# Patient Record
Sex: FEMALE | Race: OTHER | NOT HISPANIC OR LATINO
[De-identification: names, ages, dates, MRNs, and addresses within clinical notes are randomized per-mention and may not be internally consistent; named-entity substitution may affect disease eponyms.]

---

## 2021-01-04 PROBLEM — Z00.00 ENCOUNTER FOR PREVENTIVE HEALTH EXAMINATION: Status: ACTIVE | Noted: 2021-01-04

## 2021-01-08 ENCOUNTER — APPOINTMENT (OUTPATIENT)
Dept: OBGYN | Facility: CLINIC | Age: 26
End: 2021-01-08
Payer: COMMERCIAL

## 2021-01-08 VITALS
BODY MASS INDEX: 28.44 KG/M2 | TEMPERATURE: 96.8 F | WEIGHT: 210 LBS | DIASTOLIC BLOOD PRESSURE: 70 MMHG | SYSTOLIC BLOOD PRESSURE: 110 MMHG | HEIGHT: 72 IN

## 2021-01-08 DIAGNOSIS — Z78.9 OTHER SPECIFIED HEALTH STATUS: ICD-10-CM

## 2021-01-08 DIAGNOSIS — Z80.3 FAMILY HISTORY OF MALIGNANT NEOPLASM OF BREAST: ICD-10-CM

## 2021-01-08 DIAGNOSIS — Z80.49 FAMILY HISTORY OF MALIGNANT NEOPLASM OF OTHER GENITAL ORGANS: ICD-10-CM

## 2021-01-08 PROCEDURE — 81003 URINALYSIS AUTO W/O SCOPE: CPT | Mod: QW

## 2021-01-08 PROCEDURE — 99202 OFFICE O/P NEW SF 15 MIN: CPT

## 2021-01-08 PROCEDURE — 99072 ADDL SUPL MATRL&STAF TM PHE: CPT

## 2021-01-08 PROCEDURE — 36415 COLL VENOUS BLD VENIPUNCTURE: CPT

## 2021-01-08 NOTE — REASON FOR VISIT
[Initial] : an initial consultation for [Dysmenorrhea] : dysmenorrhea [Menorrhagia] : menorrhagia [Pelvic Pain] : pelvic pain [Dyspareunia] : dyspareunia

## 2021-01-15 ENCOUNTER — APPOINTMENT (OUTPATIENT)
Dept: OBGYN | Facility: CLINIC | Age: 26
End: 2021-01-15
Payer: COMMERCIAL

## 2021-01-15 ENCOUNTER — ASOB RESULT (OUTPATIENT)
Age: 26
End: 2021-01-15

## 2021-01-15 VITALS
DIASTOLIC BLOOD PRESSURE: 76 MMHG | TEMPERATURE: 96.8 F | HEIGHT: 72 IN | SYSTOLIC BLOOD PRESSURE: 112 MMHG | WEIGHT: 210 LBS | BODY MASS INDEX: 28.44 KG/M2

## 2021-01-15 DIAGNOSIS — Z01.419 ENCOUNTER FOR GYNECOLOGICAL EXAMINATION (GENERAL) (ROUTINE) W/OUT ABNORMAL FINDINGS: ICD-10-CM

## 2021-01-15 LAB
APPEARANCE: CLEAR
BACTERIA UR CULT: NORMAL
BACTERIA: NEGATIVE
BILIRUB UR QL STRIP: ABNORMAL
BILIRUBIN URINE: NEGATIVE
BLOOD URINE: NEGATIVE
C TRACH RRNA SPEC QL NAA+PROBE: NOT DETECTED
CANDIDA VAG CYTO: NOT DETECTED
COLOR: COLORLESS
ESTRADIOL SERPL-MCNC: 43 PG/ML
FSH SERPL-MCNC: 8 IU/L
G VAGINALIS+PREV SP MTYP VAG QL MICRO: NOT DETECTED
GLUCOSE QUALITATIVE U: NEGATIVE
GLUCOSE UR-MCNC: NORMAL
HAV IGM SER QL: NONREACTIVE
HBV CORE IGM SER QL: NONREACTIVE
HBV SURFACE AG SER QL: NONREACTIVE
HCG SERPL-MCNC: <1 MIU/ML
HCG UR QL: 0.2 EU/DL
HCG UR QL: NEGATIVE
HCV AB SER QL: NONREACTIVE
HCV S/CO RATIO: 0.1 S/CO
HGB UR QL STRIP.AUTO: NORMAL
HIV1+2 AB SPEC QL IA.RAPID: NONREACTIVE
HSV 1+2 IGG SER IA-IMP: NEGATIVE
HSV 1+2 IGG SER IA-IMP: NEGATIVE
HSV1 IGG SER QL: <0.01 INDEX
HSV2 IGG SER QL: 0.08 INDEX
HYALINE CASTS: 0 /LPF
KETONES UR-MCNC: ABNORMAL
KETONES URINE: NEGATIVE
LEUKOCYTE ESTERASE UR QL STRIP: NORMAL
LEUKOCYTE ESTERASE URINE: NEGATIVE
MICROSCOPIC-UA: NORMAL
N GONORRHOEA RRNA SPEC QL NAA+PROBE: NOT DETECTED
NITRITE UR QL STRIP: NORMAL
NITRITE URINE: NEGATIVE
PH UR STRIP: 7
PH URINE: 7
PROLACTIN SERPL-MCNC: 17 NG/ML
PROT UR STRIP-MCNC: ABNORMAL
PROTEIN URINE: NEGATIVE
QUALITY CONTROL: YES
RED BLOOD CELLS URINE: 1 /HPF
SOURCE AMPLIFICATION: NORMAL
SP GR UR STRIP: 1.02
SPECIFIC GRAVITY URINE: 1.01
SQUAMOUS EPITHELIAL CELLS: 0 /HPF
T PALLIDUM AB SER QL IA: NEGATIVE
T VAGINALIS VAG QL WET PREP: NOT DETECTED
TSH SERPL-ACNC: 1.22 UIU/ML
UROBILINOGEN URINE: NORMAL
WHITE BLOOD CELLS URINE: 0 /HPF

## 2021-01-15 PROCEDURE — 99395 PREV VISIT EST AGE 18-39: CPT

## 2021-01-15 PROCEDURE — 99072 ADDL SUPL MATRL&STAF TM PHE: CPT

## 2021-01-15 PROCEDURE — 81025 URINE PREGNANCY TEST: CPT

## 2021-01-15 PROCEDURE — 81003 URINALYSIS AUTO W/O SCOPE: CPT | Mod: QW

## 2021-01-15 PROCEDURE — 76830 TRANSVAGINAL US NON-OB: CPT

## 2021-01-15 PROCEDURE — 99213 OFFICE O/P EST LOW 20 MIN: CPT | Mod: 25

## 2021-01-28 LAB — CYTOLOGY CVX/VAG DOC THIN PREP: NORMAL

## 2021-01-29 ENCOUNTER — TRANSCRIPTION ENCOUNTER (OUTPATIENT)
Age: 26
End: 2021-01-29

## 2021-02-26 ENCOUNTER — OUTPATIENT (OUTPATIENT)
Dept: OUTPATIENT SERVICES | Facility: HOSPITAL | Age: 26
LOS: 1 days | End: 2021-02-26
Payer: COMMERCIAL

## 2021-02-26 DIAGNOSIS — Z01.818 ENCOUNTER FOR OTHER PREPROCEDURAL EXAMINATION: ICD-10-CM

## 2021-02-26 LAB
APPEARANCE UR: CLEAR — SIGNIFICANT CHANGE UP
APTT BLD: 29.6 SEC — SIGNIFICANT CHANGE UP (ref 27.5–35.5)
BASOPHILS # BLD AUTO: 0.02 K/UL — SIGNIFICANT CHANGE UP (ref 0–0.2)
BASOPHILS # BLD AUTO: 0.03 K/UL
BASOPHILS NFR BLD AUTO: 0.3 % — SIGNIFICANT CHANGE UP (ref 0–2)
BASOPHILS NFR BLD AUTO: 0.6 %
BILIRUB UR-MCNC: NEGATIVE — SIGNIFICANT CHANGE UP
COLOR SPEC: YELLOW — SIGNIFICANT CHANGE UP
DIFF PNL FLD: NEGATIVE — SIGNIFICANT CHANGE UP
EOSINOPHIL # BLD AUTO: 0.03 K/UL — SIGNIFICANT CHANGE UP (ref 0–0.5)
EOSINOPHIL # BLD AUTO: 0.05 K/UL
EOSINOPHIL NFR BLD AUTO: 0.4 % — SIGNIFICANT CHANGE UP (ref 0–6)
EOSINOPHIL NFR BLD AUTO: 1 %
GLUCOSE UR QL: NEGATIVE MG/DL — SIGNIFICANT CHANGE UP
HCG UR QL: NEGATIVE — SIGNIFICANT CHANGE UP
HCT VFR BLD CALC: 39.5 % — SIGNIFICANT CHANGE UP (ref 34.5–45)
HCT VFR BLD CALC: 40.7 %
HGB BLD-MCNC: 13 G/DL — SIGNIFICANT CHANGE UP (ref 11.5–15.5)
HGB BLD-MCNC: 13.3 G/DL
IMM GRANULOCYTES NFR BLD AUTO: 0 %
IMM GRANULOCYTES NFR BLD AUTO: 0.1 % — SIGNIFICANT CHANGE UP (ref 0–1.5)
INR BLD: 1.03 RATIO — SIGNIFICANT CHANGE UP (ref 0.88–1.16)
KETONES UR-MCNC: NEGATIVE — SIGNIFICANT CHANGE UP
LEUKOCYTE ESTERASE UR-ACNC: NEGATIVE — SIGNIFICANT CHANGE UP
LYMPHOCYTES # BLD AUTO: 2.66 K/UL
LYMPHOCYTES # BLD AUTO: 3.21 K/UL — SIGNIFICANT CHANGE UP (ref 1–3.3)
LYMPHOCYTES # BLD AUTO: 42.7 % — SIGNIFICANT CHANGE UP (ref 13–44)
LYMPHOCYTES NFR BLD AUTO: 52.4 %
MAN DIFF?: NORMAL
MCHC RBC-ENTMCNC: 30.7 PG — SIGNIFICANT CHANGE UP (ref 27–34)
MCHC RBC-ENTMCNC: 31.1 PG
MCHC RBC-ENTMCNC: 32.7 GM/DL
MCHC RBC-ENTMCNC: 32.9 GM/DL — SIGNIFICANT CHANGE UP (ref 32–36)
MCV RBC AUTO: 93.4 FL — SIGNIFICANT CHANGE UP (ref 80–100)
MCV RBC AUTO: 95.1 FL
MONOCYTES # BLD AUTO: 0.25 K/UL
MONOCYTES # BLD AUTO: 0.4 K/UL — SIGNIFICANT CHANGE UP (ref 0–0.9)
MONOCYTES NFR BLD AUTO: 4.9 %
MONOCYTES NFR BLD AUTO: 5.3 % — SIGNIFICANT CHANGE UP (ref 2–14)
NEUTROPHILS # BLD AUTO: 2.09 K/UL
NEUTROPHILS # BLD AUTO: 3.84 K/UL — SIGNIFICANT CHANGE UP (ref 1.8–7.4)
NEUTROPHILS NFR BLD AUTO: 41.1 %
NEUTROPHILS NFR BLD AUTO: 51.2 % — SIGNIFICANT CHANGE UP (ref 43–77)
NITRITE UR-MCNC: NEGATIVE — SIGNIFICANT CHANGE UP
PH UR: 8 — SIGNIFICANT CHANGE UP (ref 5–8)
PLATELET # BLD AUTO: 272 K/UL — SIGNIFICANT CHANGE UP (ref 150–400)
PLATELET # BLD AUTO: 306 K/UL
PROT UR-MCNC: NEGATIVE MG/DL — SIGNIFICANT CHANGE UP
PROTHROM AB SERPL-ACNC: 11.9 SEC — SIGNIFICANT CHANGE UP (ref 10.6–13.6)
RBC # BLD: 4.23 M/UL — SIGNIFICANT CHANGE UP (ref 3.8–5.2)
RBC # BLD: 4.28 M/UL
RBC # FLD: 12.3 % — SIGNIFICANT CHANGE UP (ref 10.3–14.5)
RBC # FLD: 12.8 %
SP GR SPEC: 1.01 — SIGNIFICANT CHANGE UP (ref 1.01–1.02)
UROBILINOGEN FLD QL: NEGATIVE MG/DL — SIGNIFICANT CHANGE UP
WBC # BLD: 7.51 K/UL — SIGNIFICANT CHANGE UP (ref 3.8–10.5)
WBC # FLD AUTO: 5.08 K/UL
WBC # FLD AUTO: 7.51 K/UL — SIGNIFICANT CHANGE UP (ref 3.8–10.5)

## 2021-02-26 PROCEDURE — 85730 THROMBOPLASTIN TIME PARTIAL: CPT

## 2021-02-26 PROCEDURE — 81003 URINALYSIS AUTO W/O SCOPE: CPT

## 2021-02-26 PROCEDURE — G0463: CPT

## 2021-02-26 PROCEDURE — 36415 COLL VENOUS BLD VENIPUNCTURE: CPT

## 2021-02-26 PROCEDURE — 85025 COMPLETE CBC W/AUTO DIFF WBC: CPT

## 2021-02-26 PROCEDURE — 81025 URINE PREGNANCY TEST: CPT

## 2021-02-26 PROCEDURE — 85610 PROTHROMBIN TIME: CPT

## 2021-03-03 ENCOUNTER — NON-APPOINTMENT (OUTPATIENT)
Age: 26
End: 2021-03-03

## 2021-03-03 DIAGNOSIS — Z01.818 ENCOUNTER FOR OTHER PREPROCEDURAL EXAMINATION: ICD-10-CM

## 2021-03-05 ENCOUNTER — APPOINTMENT (OUTPATIENT)
Dept: DISASTER EMERGENCY | Facility: CLINIC | Age: 26
End: 2021-03-05

## 2021-03-06 LAB — SARS-COV-2 N GENE NPH QL NAA+PROBE: NOT DETECTED

## 2021-03-10 ENCOUNTER — RESULT REVIEW (OUTPATIENT)
Age: 26
End: 2021-03-10

## 2021-03-10 ENCOUNTER — APPOINTMENT (OUTPATIENT)
Dept: OBGYN | Facility: AMBULATORY SURGERY CENTER | Age: 26
End: 2021-03-10
Payer: COMMERCIAL

## 2021-03-10 PROCEDURE — 58558 HYSTEROSCOPY BIOPSY: CPT

## 2021-03-10 NOTE — HISTORY OF PRESENT ILLNESS
[Menarche Age: ____] : age at menarche was [unfilled] [No] : Patient does not have concerns regarding sex [Currently Active] : currently active [Men] : men [Y] : Patient uses contraception [Condoms] : uses condoms [N] : Patient denies prior pregnancies [GonorrheaDate] : 01/08/2021 [ChlamydiaDate] : 01/08/2021 [LMPDate] : 01/03/2021 [PGHxTotal] : 0 [FreeTextEntry1] : 01/03/2021

## 2021-03-10 NOTE — PHYSICAL EXAM
[Appropriately responsive] : appropriately responsive [Alert] : alert [No Acute Distress] : no acute distress [Soft] : soft [Non-tender] : non-tender [Non-distended] : non-distended [No Mass] : no mass [Oriented x3] : oriented x3 [Labia Majora] : normal [Labia Minora] : normal [Scant] : scant [White] : white [Thin] : thin [No Bleeding] : There was no active vaginal bleeding [Normal] : normal [Uterine Adnexae] : normal [Foul Smelling] : not foul smelling

## 2021-03-10 NOTE — DISCUSSION/SUMMARY
[FreeTextEntry1] : -Annual well woman visit done today. Pap collected. Patient declines STI testing.\par \par -Irregular periods with heavy menses-\par 1) AUB labs were reviewed and WNL.\par 2) Pelvic sono: Retroverted uterus measuring 8.75 x 5.61 x 4.51 cm. EMS 13.0 mm with irregular walls. Suspected endometrial polyp (6 mm). Simple right ovarian cyst 2.09 cm. Normal appearing left ovary. No free fluid. Results were discussed.\par 3) Medical and surgical options were reviewed. Patient declines hormonal contraception. She desires surgical management with Dilation and curettage with hysteroscopy, possible polypectomy. Procedure details, r/b/a were discussed. Risks discussed include but are not limited to pain, bleeding, infection, uterine perforation and injury to nearby organs. Patient agrees to proceed with the procedure and will be scheduled. Task sent for scheduling.\par \par -Dysmenorrhea- Patient declines hormonal contraception. Recommended taking NSAIDs OTC during her menses.\par \par -Chronic LLQ abdominal pain- Sono today shows normal appearing left ovary. She reports feeling constipated- recommended increasing oral hydration, fiber intake in her diet, fiber supplementation OTC, and Colace PRN. Recommended that she follow up with PCP and GI for further evaluation for non-GYN etiologies of her pain. We discussed considering diagnostic laparoscopy if her pain persists and her work up with other providers are negative.\par \par -She was advised to take vitamin D, calcium, and continue exercises.\par \par -Will be scheduled for D&C hysteroscopy with polypectomy.\par \par All questions and concerns were discussed.

## 2021-03-10 NOTE — HISTORY OF PRESENT ILLNESS
[Menarche Age ____] : age at menarche was [unfilled] [Definite ___ (Date)] : the last menstrual period was [unfilled] [Excessive Bleeding] : bleeding has been excessive [Using ___ Pads Per 24 Hr] : she has been using [unfilled] pads per 24 hours [Irregular Duration] : has been irregular [Bleeding Usually Lasts ___ Days] : usually last [unfilled] days [Dysmenorrhea] : dysmenorrhea [Y] : Patient is sexually active [Monogamous (Male Partner)] : is monogamous with a male partner [N] : Patient denies prior pregnancies [Menarche Age: ____] : age at menarche was [unfilled] [Menstrual Cramps] : menstrual cramps [Currently Active] : currently active [Men] : men [No] : No [Yes] : Yes [Condoms] : Condoms [Patient would like to be screened for STIs] : Patient would like to be screened for STIs [PapSmeardate] : 2018 [LMPDate] : 1/03/2021 [PGHxTotal] : 0 [FreeTextEntry1] : 1/03/2021

## 2021-03-10 NOTE — DISCUSSION/SUMMARY
[FreeTextEntry1] : -Irregular periods with heavy and painful periods- AUB labs and Pelvic sono ordered.\par \par -Chronic LLQ abdominal pain- Benign abdominal/pelvic exam. GC/Chl and Affirm ordered to r/o vaginitis. Urine culture ordered to r/o UTI. Pelvic sono ordered. She also previously followed with GI. Recommended taking OTC pain meds (i.e. NSAIDs or Tylenol) for pain. \par \par -Patient is sexually active and is using condoms. She declines contraception. She desires STI testing- STI labs were ordered.\par \par -Follow up in 1 week for annual GYN exam and pelvic sono. Call parameters were reviewed.

## 2021-03-10 NOTE — PHYSICAL EXAM
[Appropriately responsive] : appropriately responsive [Alert] : alert [No Acute Distress] : no acute distress [Soft] : soft [Non-distended] : non-distended [No Mass] : no mass [Oriented x3] : oriented x3 [Labia Majora] : normal [Labia Minora] : normal [No Bleeding] : There was no active vaginal bleeding [Examination Of The Breasts] : a normal appearance [Normal] : normal [No Discharge] : no discharge [No Masses] : no breast masses were palpable [Uterine Adnexae] : normal  [Adnexa Tenderness On The Left] : tender [FreeTextEntry7] : Mild TTP in left groin area. No rebound tenderness. No guarding. No acute abdomen. [Tenderness] : nontender [Enlarged ___ wks] : not enlarged [Mass ___ cm] : no uterine mass was palpated [FreeTextEntry4] : No vaginal discharge [FreeTextEntry6] : Mild tenderness in left adnexa. No palpable masses.

## 2021-03-10 NOTE — END OF VISIT
[FreeTextEntry3] : I, Nina Wright, solely acted as scribe for Dr. Jolanta Malin on 01/15/2021.\par All medical entries made by the Scribe were at my, Dr. Malin's, direction and personally dictated by me on 01/15/2021. I have reviewed the chart and agree that the record accurately reflects my personal performance of the history, physical exam, assessment and plan. I have also personally directed, reviewed, and agreed with the chart.

## 2021-03-26 ENCOUNTER — APPOINTMENT (OUTPATIENT)
Dept: OBGYN | Facility: CLINIC | Age: 26
End: 2021-03-26
Payer: COMMERCIAL

## 2021-03-26 VITALS
DIASTOLIC BLOOD PRESSURE: 70 MMHG | BODY MASS INDEX: 28.99 KG/M2 | WEIGHT: 214 LBS | SYSTOLIC BLOOD PRESSURE: 104 MMHG | HEIGHT: 72 IN | TEMPERATURE: 96.8 F

## 2021-03-26 DIAGNOSIS — Z09 ENCOUNTER FOR FOLLOW-UP EXAMINATION AFTER COMPLETED TREATMENT FOR CONDITIONS OTHER THAN MALIGNANT NEOPLASM: ICD-10-CM

## 2021-03-26 PROCEDURE — 99212 OFFICE O/P EST SF 10 MIN: CPT

## 2021-03-26 PROCEDURE — 99072 ADDL SUPL MATRL&STAF TM PHE: CPT

## 2021-03-29 PROBLEM — Z09 POSTOP CHECK: Status: ACTIVE | Noted: 2021-03-29

## 2021-03-29 NOTE — END OF VISIT
[FreeTextEntry3] : I, Evie Melchor, solely acted as a scribe for Dr. Jolanta Waldrop on 03/26/2021 . All medical entries made by the Scribe were at my, Dr. Malin's, direction and personally dictated by me on 03/26/2021. I have reviewed the chart and agree that the record accurately reflects my personal performance of the history, physical exam, assessment and plan. I have also personally directed, reviewed and agreed with the chart.

## 2021-03-29 NOTE — PHYSICAL EXAM
[Appropriately responsive] : appropriately responsive [Alert] : alert [No Acute Distress] : no acute distress [Oriented x3] : oriented x3 [Labia Majora] : normal [Labia Minora] : normal [No Bleeding] : There was no active vaginal bleeding [Normal] : normal [Soft] : soft [Non-tender] : non-tender [Non-distended] : non-distended [No Mass] : no mass [Discharge] : discharge

## 2021-03-29 NOTE — DISCUSSION/SUMMARY
[FreeTextEntry1] : -Patient is doing well post-op from her D&C hysteroscopy, polypectomy on 3/10/21. We reviewed her benign pathology.\par \par -Irregular periods with heavy menses- Patient declines hormonal contraception. She desires expectant management. \par \par -Chronic LLQ abdominal pain- She is seeing GI and will be scheduling her colonoscopy soon. \par \par -Follow in 3 months to re-assess her menses or PRN.

## 2021-03-30 ENCOUNTER — NON-APPOINTMENT (OUTPATIENT)
Age: 26
End: 2021-03-30

## 2021-04-22 ENCOUNTER — NON-APPOINTMENT (OUTPATIENT)
Age: 26
End: 2021-04-22

## 2021-04-22 ENCOUNTER — APPOINTMENT (OUTPATIENT)
Dept: OBGYN | Facility: CLINIC | Age: 26
End: 2021-04-22
Payer: COMMERCIAL

## 2021-04-22 ENCOUNTER — ASOB RESULT (OUTPATIENT)
Age: 26
End: 2021-04-22

## 2021-04-22 ENCOUNTER — EMERGENCY (EMERGENCY)
Facility: HOSPITAL | Age: 26
LOS: 1 days | Discharge: HOME | End: 2021-04-22
Attending: EMERGENCY MEDICINE
Payer: COMMERCIAL

## 2021-04-22 ENCOUNTER — RESULT CHARGE (OUTPATIENT)
Age: 26
End: 2021-04-22

## 2021-04-22 VITALS
RESPIRATION RATE: 18 BRPM | HEART RATE: 70 BPM | TEMPERATURE: 99 F | HEIGHT: 72 IN | WEIGHT: 210.98 LBS | OXYGEN SATURATION: 98 %

## 2021-04-22 VITALS
HEART RATE: 67 BPM | SYSTOLIC BLOOD PRESSURE: 107 MMHG | OXYGEN SATURATION: 98 % | TEMPERATURE: 98 F | DIASTOLIC BLOOD PRESSURE: 74 MMHG | RESPIRATION RATE: 16 BRPM

## 2021-04-22 VITALS
TEMPERATURE: 97.2 F | HEIGHT: 72 IN | WEIGHT: 211 LBS | DIASTOLIC BLOOD PRESSURE: 72 MMHG | BODY MASS INDEX: 28.58 KG/M2 | SYSTOLIC BLOOD PRESSURE: 124 MMHG

## 2021-04-22 DIAGNOSIS — Z11.3 ENCOUNTER FOR SCREENING FOR INFECTIONS WITH A PREDOMINANTLY SEXUAL MODE OF TRANSMISSION: ICD-10-CM

## 2021-04-22 LAB
ANION GAP SERPL CALC-SCNC: 12 MMOL/L — SIGNIFICANT CHANGE UP (ref 5–17)
APTT BLD: 29.7 SEC — SIGNIFICANT CHANGE UP (ref 27.5–35.5)
BILIRUB UR QL STRIP: NORMAL
BLD GP AB SCN SERPL QL: SIGNIFICANT CHANGE UP
BUN SERPL-MCNC: 6 MG/DL — LOW (ref 8–20)
CALCIUM SERPL-MCNC: 9.1 MG/DL — SIGNIFICANT CHANGE UP (ref 8.6–10.2)
CHLORIDE SERPL-SCNC: 102 MMOL/L — SIGNIFICANT CHANGE UP (ref 98–107)
CO2 SERPL-SCNC: 25 MMOL/L — SIGNIFICANT CHANGE UP (ref 22–29)
CREAT SERPL-MCNC: 0.55 MG/DL — SIGNIFICANT CHANGE UP (ref 0.5–1.3)
GLUCOSE SERPL-MCNC: 80 MG/DL — SIGNIFICANT CHANGE UP (ref 70–99)
GLUCOSE UR-MCNC: NORMAL
HCG UR QL: 0.2 EU/DL
HCG UR QL: NEGATIVE
HCT VFR BLD CALC: 37.8 % — SIGNIFICANT CHANGE UP (ref 34.5–45)
HGB BLD-MCNC: 13 G/DL — SIGNIFICANT CHANGE UP (ref 11.5–15.5)
HGB UR QL STRIP.AUTO: ABNORMAL
INR BLD: 1.14 RATIO — SIGNIFICANT CHANGE UP (ref 0.88–1.16)
KETONES UR-MCNC: NORMAL
LEUKOCYTE ESTERASE UR QL STRIP: NORMAL
MCHC RBC-ENTMCNC: 30.9 PG — SIGNIFICANT CHANGE UP (ref 27–34)
MCHC RBC-ENTMCNC: 34.4 GM/DL — SIGNIFICANT CHANGE UP (ref 32–36)
MCV RBC AUTO: 89.8 FL — SIGNIFICANT CHANGE UP (ref 80–100)
NITRITE UR QL STRIP: NORMAL
PH UR STRIP: 7.5
PLATELET # BLD AUTO: 258 K/UL — SIGNIFICANT CHANGE UP (ref 150–400)
POTASSIUM SERPL-MCNC: 3.9 MMOL/L — SIGNIFICANT CHANGE UP (ref 3.5–5.3)
POTASSIUM SERPL-SCNC: 3.9 MMOL/L — SIGNIFICANT CHANGE UP (ref 3.5–5.3)
PROT UR STRIP-MCNC: NORMAL
PROTHROM AB SERPL-ACNC: 13.1 SEC — SIGNIFICANT CHANGE UP (ref 10.6–13.6)
QUALITY CONTROL: YES
RBC # BLD: 4.21 M/UL — SIGNIFICANT CHANGE UP (ref 3.8–5.2)
RBC # FLD: 12.2 % — SIGNIFICANT CHANGE UP (ref 10.3–14.5)
SARS-COV-2 RNA SPEC QL NAA+PROBE: SIGNIFICANT CHANGE UP
SODIUM SERPL-SCNC: 139 MMOL/L — SIGNIFICANT CHANGE UP (ref 135–145)
SP GR UR STRIP: 1.02
WBC # BLD: 5.98 K/UL — SIGNIFICANT CHANGE UP (ref 3.8–10.5)
WBC # FLD AUTO: 5.98 K/UL — SIGNIFICANT CHANGE UP (ref 3.8–10.5)

## 2021-04-22 PROCEDURE — 81025 URINE PREGNANCY TEST: CPT

## 2021-04-22 PROCEDURE — 85730 THROMBOPLASTIN TIME PARTIAL: CPT

## 2021-04-22 PROCEDURE — 86901 BLOOD TYPING SEROLOGIC RH(D): CPT

## 2021-04-22 PROCEDURE — 76856 US EXAM PELVIC COMPLETE: CPT | Mod: 26,59

## 2021-04-22 PROCEDURE — 86850 RBC ANTIBODY SCREEN: CPT

## 2021-04-22 PROCEDURE — 99072 ADDL SUPL MATRL&STAF TM PHE: CPT

## 2021-04-22 PROCEDURE — 93975 VASCULAR STUDY: CPT

## 2021-04-22 PROCEDURE — 81003 URINALYSIS AUTO W/O SCOPE: CPT | Mod: QW

## 2021-04-22 PROCEDURE — 76830 TRANSVAGINAL US NON-OB: CPT

## 2021-04-22 PROCEDURE — 76856 US EXAM PELVIC COMPLETE: CPT

## 2021-04-22 PROCEDURE — 36415 COLL VENOUS BLD VENIPUNCTURE: CPT

## 2021-04-22 PROCEDURE — 85610 PROTHROMBIN TIME: CPT

## 2021-04-22 PROCEDURE — 80048 BASIC METABOLIC PNL TOTAL CA: CPT

## 2021-04-22 PROCEDURE — 99284 EMERGENCY DEPT VISIT MOD MDM: CPT | Mod: 25

## 2021-04-22 PROCEDURE — 76830 TRANSVAGINAL US NON-OB: CPT | Mod: 26

## 2021-04-22 PROCEDURE — 99284 EMERGENCY DEPT VISIT MOD MDM: CPT

## 2021-04-22 PROCEDURE — 99213 OFFICE O/P EST LOW 20 MIN: CPT | Mod: 25

## 2021-04-22 PROCEDURE — 85027 COMPLETE CBC AUTOMATED: CPT

## 2021-04-22 PROCEDURE — 93975 VASCULAR STUDY: CPT | Mod: 26

## 2021-04-22 PROCEDURE — 87635 SARS-COV-2 COVID-19 AMP PRB: CPT

## 2021-04-22 PROCEDURE — 86900 BLOOD TYPING SEROLOGIC ABO: CPT

## 2021-04-22 NOTE — HISTORY OF PRESENT ILLNESS
[Menarche Age ____] : age at menarche was [unfilled] [Irregular Cycle Intervals] : are  irregular [Dysmenorrhea] : dysmenorrhea [Y] : Patient is sexually active [Menarche Age: ____] : age at menarche was [unfilled] [Currently Active] : currently active [Men] : men [Vaginal] : vaginal [No] : No [Yes] : Yes [Condoms] : Condoms [Patient refuses STI testing] : Patient refuses STI testing [N] : Patient reports normal menses [TextBox_4] : pt states is having RLQ pain for 2 weeks. pt states was sent to [facility] on 4/10/21 and [facility] on 4/13/21 per her PCP. pt had 2 sonograms, 2 transvaginal sonograms and oral & iv contrast CT scan that came up normal pt also had colonoscopy on 4/16/21 that was normal. pt does not get relief with medications [Papeardate] : 1/15/2021 [TextBox_31] : neg [HIVDate] : 1/8/2021 [TextBox_53] : neg [SyphilisDate] : 1/8/2021 [TextBox_58] : neg [GonorrheaDate] : 1/8/2021 [TextBox_63] : nneg [ChlamydiaDate] : 1/8/2021 [TextBox_68] : neg [HepatitisBDate] : 1/8/2021 [TextBox_83] : neg [HepatitisCDate] : 1/8/2021 [TextBox_88] : neg [LMPDate] : 3/28/2021 [FreeTextEntry1] : 3/28/21

## 2021-04-22 NOTE — PROCEDURE
[GC Chlamydia Culture] : GC Chlamydia Culture [Affirm (Triple Culture)] : Affirm (triple culture) [No Complications] : there were no complications [Tolerated Well] : the patient tolerated the procedure well

## 2021-04-22 NOTE — CONSULT NOTE ADULT - ASSESSMENT
A/P: 27yo P0 LMP 3/28/21 who is being evaluated for rule out torsion.  -Pain rated at 5/10, but refusing pain medication. Nausea present, but refusing anti-emetic.  -VSS and PE benign   -Hgb 13, stable   -WBC: 5.98  -TVUS findings significant for corpus luteal cyst in right ovary with hemorrhagic content measuring 2.6 cm. Unremarkable left ovary. No evidence of ovarian torsion at the time of the study.  -No surgical intervention indicated at this time.     Patient unlikely to have an ovarian torsion at this time given level of pain, PE without an acute abdomen and TVUS findings of normal arterial and venous flow to both ovaries. Patient is stable for discharge to home with outpatient follow up within 1 week to discuss possible diagnostic laparoscopy. Patient offered pain medication to be sent to pharmacy, but she declines. Differential diagnoses discussed with the patient. Return precautions given. Patient verbalized understanding and agreement with plan.     D/W Dr. Padilla.

## 2021-04-22 NOTE — ED PROVIDER NOTE - PATIENT PORTAL LINK FT
You can access the FollowMyHealth Patient Portal offered by [facility] by registering at the following website: [email or web address]. By joining [employer]’s FollowMyHealth portal, you will also be able to view your health information using other applications (apps) compatible with our system.

## 2021-04-22 NOTE — PHYSICAL EXAM
[Appropriately responsive] : appropriately responsive [Alert] : alert [No Acute Distress] : no acute distress [Oriented x3] : oriented x3 [Labia Majora] : normal [Labia Minora] : normal [No Bleeding] : There was no active vaginal bleeding [Normal] : normal [Adnexa Tenderness On The Right] : tender  [Soft] : soft [Non-distended] : non-distended [No Mass] : no mass [Discharge] : discharge [Scant] : scant [White] : white [FreeTextEntry7] : Tenderness to palpation in RLQ. +Guarding. No rebound tenderness. [Foul Smelling] : not foul smelling [Tenderness] : nontender [Enlarged ___ wks] : not enlarged [Mass ___ cm] : no uterine mass was palpated [FreeTextEntry5] : No cervical motion tenderness. [FreeTextEntry6] : Right adnexal tenderness. No left adnexal tenderness.

## 2021-04-22 NOTE — ED ADULT NURSE NOTE - CHIEF COMPLAINT QUOTE
Sent in by OBGYN concerned for "intermittent right ovarian torsion." Intermittent right lower quadrant pain x2 weeks with varying severity .  had negative workup at [place] 2 weeks ago.  denies vaginal bleeding, denies fever. 5/10 pain at this moment.

## 2021-04-22 NOTE — ED PROVIDER NOTE - ATTENDING WITH...
Returned pt call and informed pt that I was going to get the status of her mirena and see if it was received at the [facility] location on Monday. Pt verbalized understanding   ACP

## 2021-04-22 NOTE — ED PROVIDER NOTE - ATTENDING CONTRIBUTION TO CARE
I, Alecia Rob, performed a face to face bedside interview with this patient regarding history of present illness, review of symptoms and relevant past medical, social and family history.  I completed an independent physical examination. Medical decision making, follow-up on ordered tests (ie labs, radiologic studies) and re-evaluation of the patient's status has been communicated to the ACP.  Disposition of the patient will be based on test outcome and response to ED interventions.     labs, sono, GYN consult

## 2021-04-22 NOTE — CONSULT NOTE ADULT - SUBJECTIVE AND OBJECTIVE BOX
Patient is a     POB:  PGYN: hx of dysmenorrhea and AUB-H s/p D&C in 3/21 with benign findings, -fibroids, hx of right ovarian cysts, denies STD hx, denies abnormal PAP smears  PMH:   PSH: D&C  SH: Denies EtOH, tobacco and illicit drug use; Feels safe at home   Meds:  All:  Patient is a 27yo P0 LMP 3/28/21 who was sent in from the office for rule out torsion. Patient has been having RLQ pain for 2 weeks. At baseline, the pain is rated at a 2/10, but she has intermittent episodes of 10/10 sharp pain that are not alleviated by pain medication and associated with nausea and diaphoresis. Today, she complains of a 5/10 RLQ pain, which she has not wanted to take pain medication for. She also endorses nausea. Of note, she has gone to [place] ED and [facility] ED for rule out ovarian torsion work up, which have been negative as per patient. Patient denies fevers, chills, vomiting, lightheadedness, dizziness, palpitations, shortness of breath and chest pain.      POB: Denies  PGYN: hx of dysmenorrhea and AUB-H s/p D&C in 3/21 with benign findings, -fibroids, hx of right ovarian cysts, denies STD hx, denies abnormal PAP smears  PMH: Denies  PSH: D&C  SH: Endorses social EtOH use, but denies tobacco and illicit drug use  Meds: Tylenol and ibuprofen PRN   All: NKDA    Vital Signs Last 24 Hrs  T(C): 36.9 (22 Apr 2021 16:20), Max: 37.2 (22 Apr 2021 13:27)  T(F): 98.4 (22 Apr 2021 16:20), Max: 98.9 (22 Apr 2021 13:27)  HR: 67 (22 Apr 2021 16:20) (67 - 70)  BP: 107/74 (22 Apr 2021 16:20) (107/74 - 120/84)  RR: 16 (22 Apr 2021 16:20) (16 - 18)  SpO2: 98% (22 Apr 2021 16:20) (98% - 98%)    General: NAD, well-appearing  Heart: RRR  Lungs: CTAB  Abdominal: soft, tender to RLQ with guarding, non-distended, no rebound, +BS  Ext: non-tender, non-edematous   SVE: Done in office, deferred                           13.0   5.98  )-----------( 258      ( 22 Apr 2021 14:27 )             37.8     04-22    139  |  102  |  6.0<L>  ----------------------------<  80  3.9   |  25.0  |  0.55    Ca    9.1      22 Apr 2021 14:27    US Transvaginal (04.22.21 @ 15:46)   FINDINGS:    Uterus: 8.2 cm x 4.4 cm x 5.9 cm. No fibroids. Nabothian cysts in the cervix measuring 6 mm.  Endometrium: 12 mm. Within normal limits.    Right ovary: 4.6 cm x 3.4 cm x 2.8 cm. Corpus luteal cyst with hemorrhagic content measuring 2.6 cm. Ovarian follicles are also noted. Normal arterial and venous waveforms.  Left ovary: 2.8 cm x 1.9 cm x 1.8 cm. Within normal limits. Normal arterial and venous waveforms.    Fluid: Small amount of free fluid.    IMPRESSION:  Corpus luteal cyst in right ovary with hemorrhagic content measuring 2.6 cm.    Unremarkable left ovary.    No evidence of ovarian torsion at the time of the study.

## 2021-04-22 NOTE — ED PROVIDER NOTE - OBJECTIVE STATEMENT
26 year old female with no PMhx presents to the ED for 2 weeks of intermittent RLQ abd pain associated with nausea. Pt saw her GYN, Dr. Malin who advised the pt to come to the ED for r/o torsion. Denies diarrhea, vomiting, fever, hematuria, flank pain. LMP 3/28, denies chance of pregnancy.

## 2021-04-22 NOTE — ED PROVIDER NOTE - NS ED MD EM SELECTION
30319 Exp Problem Focused - Mod. Complex 44380 Comprehensive 31522 Exp Problem Focused - Mod. Complex

## 2021-04-22 NOTE — ED PROVIDER NOTE - DISCHARGE DATE
[facility] [place] [address]  Phone: [phone]  Fax: [address] [place] [address]        Sugar 10, 2019     Patient: Heydi Bassett   YOB: 1964   Date of Visit: 6/10/2019       To Whom It May Concern: It is my medical opinion that Heydi Bassett should remain out of work through June 16, 2019 due to recent orthopedic surgery. She may return to full duty work on June 17, 2019. If you have any questions or concerns, please don't hesitate to call.     Sincerely,        MANDO Faye 22-Apr-2021

## 2021-04-22 NOTE — ED PROVIDER NOTE - NSFOLLOWUPINSTRUCTIONS_ED_ALL_ED_FT
Follow up with GYN within 1 week   Take Motrin and Tylenol as needed for pain  Return to the ED for worsening abdominal pain, vomiting, dizziness, fainting or any new or concerning symptoms   Copy of results included within discharge paperwork

## 2021-04-22 NOTE — ED ADULT NURSE NOTE - OBJECTIVE STATEMENT
pt presents to ed a&ox3, no acute distress c/o RLQ abdominal pain x 2 weeks, intermittently getting worse, constant at 2/10, sent from OBGYN to rule out R sided ovarian torsion, hx ovarian cysts. LMP 3/28/21. also reports nausea. denies vomiting diarrhea or fever. denies vaginal bleeding.

## 2021-04-22 NOTE — ED ADULT TRIAGE NOTE - CHIEF COMPLAINT QUOTE
Sent in by OBGYN concerned for "intermittent right ovarian torsion." Intermittent right lower quadrant pain x2 weeks with varying severity .  had negative workup at [place] 2 weeks ago.  denies vaginal bleeding, denies fever. 5/10 pain at this moment.

## 2021-04-22 NOTE — DISCUSSION/SUMMARY
[FreeTextEntry1] : -Right lower abdominal pain x 2 weeks- No improvement with pain medications OTC. On abdominal exam, TTP in RLQ with guarding. No rebound tenderness. On pelvic exam, right adnexal tenderness. No CMT or uterine tenderness.\par -> UCG: Negative.\par -> Pelvic sono today: 2 complex right ovarian cysts measurin.38 cm and 2.33 cm. Normal appearing uterus, normal appearing left ovary, and no FF. Results were discussed. \par -> Recommended going to [facility] ED for r/o ovarian torsion. We discussed that surgical intervention may be necessary and was recommended to stay NPO. She verbalized understanding and agrees to go to the ED now.\par \par -White vaginal discharge- GC/Chl and Affirm collected to r/o vaginitis. Denies any h/o STIs. She is in a monogamous relationship with the same partner for over 1 year.\par \par -Microscopic hematuria on U/A POC today- Patient denies any UTI symptoms. U/A and Urine culture collected.\par \par -She declines hormonal contraception for menstrual regulation and dysmenorrhea.\par \par -She was also advised to follow up with PCP and GI for her diarrhea/constipation.\par \par -Patient will go to ED now for further evaluation. She will follow up in 1 week.\par \par All questions and concerns were discussed.\par

## 2021-04-23 ENCOUNTER — NON-APPOINTMENT (OUTPATIENT)
Age: 26
End: 2021-04-23

## 2021-04-27 LAB
APPEARANCE: CLEAR
BACTERIA UR CULT: NORMAL
BACTERIA: NEGATIVE
BILIRUBIN URINE: NEGATIVE
BLOOD URINE: NEGATIVE
C TRACH RRNA SPEC QL NAA+PROBE: NOT DETECTED
CANDIDA VAG CYTO: NOT DETECTED
COLOR: NORMAL
G VAGINALIS+PREV SP MTYP VAG QL MICRO: NOT DETECTED
GLUCOSE QUALITATIVE U: NEGATIVE
HYALINE CASTS: 0 /LPF
KETONES URINE: NEGATIVE
LEUKOCYTE ESTERASE URINE: NEGATIVE
MICROSCOPIC-UA: NORMAL
N GONORRHOEA RRNA SPEC QL NAA+PROBE: NOT DETECTED
NITRITE URINE: NEGATIVE
PH URINE: 7.5
PROTEIN URINE: NORMAL
RED BLOOD CELLS URINE: 2 /HPF
SOURCE AMPLIFICATION: NORMAL
SPECIFIC GRAVITY URINE: 1.02
SQUAMOUS EPITHELIAL CELLS: 2 /HPF
T VAGINALIS VAG QL WET PREP: NOT DETECTED
UROBILINOGEN URINE: NORMAL
WHITE BLOOD CELLS URINE: 1 /HPF

## 2021-04-29 ENCOUNTER — APPOINTMENT (OUTPATIENT)
Dept: OBGYN | Facility: CLINIC | Age: 26
End: 2021-04-29
Payer: COMMERCIAL

## 2021-04-29 VITALS
BODY MASS INDEX: 40.22 KG/M2 | DIASTOLIC BLOOD PRESSURE: 70 MMHG | HEIGHT: 61 IN | WEIGHT: 213 LBS | SYSTOLIC BLOOD PRESSURE: 122 MMHG | TEMPERATURE: 97.5 F

## 2021-04-29 DIAGNOSIS — R10.30 LOWER ABDOMINAL PAIN, UNSPECIFIED: ICD-10-CM

## 2021-04-29 PROCEDURE — 81025 URINE PREGNANCY TEST: CPT

## 2021-04-29 PROCEDURE — 99072 ADDL SUPL MATRL&STAF TM PHE: CPT

## 2021-04-29 PROCEDURE — 99213 OFFICE O/P EST LOW 20 MIN: CPT

## 2021-04-30 LAB
HCG UR QL: NEGATIVE
QUALITY CONTROL: YES

## 2021-05-03 NOTE — PHYSICAL EXAM
[Appropriately responsive] : appropriately responsive [Alert] : alert [No Acute Distress] : no acute distress [Soft] : soft [Non-distended] : non-distended [Oriented x3] : oriented x3 [FreeTextEntry7] : Soft, tenderness to palpation in RLQ and LLQ. No guarding. No rebound tenderness. No acute abdomen.

## 2021-05-03 NOTE — DISCUSSION/SUMMARY
[FreeTextEntry1] : -Bilateral lower abdominal pain- Patient started her period today and reports having her usual menstrual cramping. On exam, TTP in RLQ/LLQ. No acute abdomen.\par 1) She has h/o chronic LLQ pain and recently had new onset RLQ x 3 weeks. She also has dysmenorrhea. She reports having normal CT scans and normal colonoscopy recently (no records). Differential diagnosis discussed including possible endometriosis. \par 2) We discussed her option for diagnostic laparoscopy, including procedure details, and r/b/a. Patient declines surgical management at this time.\par \par -Irregular periods w/ heavy menses and dysmenorrhea- \par 1) AUB labs were: WNL.\par 2) s/p D&C hysteroscopy, polypectomy on 3/10/21, with benign pathology.\par 3) Continue taking Ibuprofen and Tylenol PRN.\par 4)  We discussed hormonal contraceptive options. She is hesitant to take hormonal contraception due to depressive side effects while on a birth control pill in the past, however she does not remember the name of the birth control pill. Denies any h/o depression. Patient states that she is willing to try a low dose hormone birth control pill. Rx Lo Loestrin OCPs were ordered. R/b/a were discussed. Risks discussed include but are not limited to headache, mood swings, breast tenderness, weight gain, menstrual cycle changes, and blood clots. We discussed the importance of taking birth control pills daily at the same time. We discussed that she should discontinue OCPs immediately if she develops depressive symptoms and call the office. \par \par -Follow up in 2-4 weeks. Call parameters were reviewed.\par \par All questions and concerns were discussed.

## 2021-05-03 NOTE — HISTORY OF PRESENT ILLNESS
[Currently Active] : currently active [Men] : men [Vaginal] : vaginal [No] : No [FreeTextEntry1] : Ms. Zabala presents for ED follow up. She was last seen on 4/22/21 in the office for right lower abdominal pain x 2 weeks and was sent to [facility] ED for r/o ovarian torsion. Pelvic sono in the office on 4/22 showed 2 complex right ovarian cysts measuring 2.38 cm and 2.33 cm -> She had repeat pelvic sono in the ED showing a right corpus luteal and right hemorrhagic ovarian cyst (with flow). Ovarian torsion was ruled out in the ED and she was advised to follow up in our office to schedule diagnostic laparoscopy.\par \par She started her period today and reports having her usual menstrual cramping. She usually has painful periods. \par \par She also has h/o chronic LLQ pain and recently saw GI. She states that she had a colonoscopy on 4/16/21, which was normal (no records). She was also evaluated at [facility] ED on 4/10/21 and [place] ED on 4/13/21 for right lower abdominal pain with normal CT scans and pelvic ultrasounds per patient (no records). [TextBox_4] : ED follow up [PapSmeardate] : 1/15/21 [TextBox_31] : negative [LMPDate] : 4/29/21 [PGHxTotal] : 0 [TextBox_6] : 4/29/21 [TextBox_28] : n/a [TextBox_34] : n/a

## 2021-05-13 ENCOUNTER — NON-APPOINTMENT (OUTPATIENT)
Age: 26
End: 2021-05-13

## 2021-05-24 ENCOUNTER — NON-APPOINTMENT (OUTPATIENT)
Age: 26
End: 2021-05-24

## 2021-05-28 ENCOUNTER — RESULT CHARGE (OUTPATIENT)
Age: 26
End: 2021-05-28

## 2021-05-28 ENCOUNTER — APPOINTMENT (OUTPATIENT)
Dept: OBGYN | Facility: CLINIC | Age: 26
End: 2021-05-28
Payer: COMMERCIAL

## 2021-05-28 VITALS
WEIGHT: 210 LBS | SYSTOLIC BLOOD PRESSURE: 100 MMHG | HEIGHT: 72 IN | BODY MASS INDEX: 28.44 KG/M2 | TEMPERATURE: 97.2 F | DIASTOLIC BLOOD PRESSURE: 70 MMHG

## 2021-05-28 DIAGNOSIS — R10.31 RIGHT LOWER QUADRANT PAIN: ICD-10-CM

## 2021-05-28 DIAGNOSIS — N92.1 EXCESSIVE AND FREQUENT MENSTRUATION WITH IRREGULAR CYCLE: ICD-10-CM

## 2021-05-28 LAB
HCG UR QL: NEGATIVE
QUALITY CONTROL: YES

## 2021-05-28 PROCEDURE — 99214 OFFICE O/P EST MOD 30 MIN: CPT

## 2021-05-28 PROCEDURE — 99072 ADDL SUPL MATRL&STAF TM PHE: CPT

## 2021-06-01 PROBLEM — R10.31 RIGHT LOWER QUADRANT ABDOMINAL PAIN: Status: ACTIVE | Noted: 2021-04-22

## 2021-06-01 LAB
BASOPHILS # BLD AUTO: 0.02 K/UL
BASOPHILS NFR BLD AUTO: 0.3 %
EOSINOPHIL # BLD AUTO: 0.05 K/UL
EOSINOPHIL NFR BLD AUTO: 0.8 %
HCT VFR BLD CALC: 40.5 %
HGB BLD-MCNC: 13.2 G/DL
IMM GRANULOCYTES NFR BLD AUTO: 0 %
LYMPHOCYTES # BLD AUTO: 2.87 K/UL
LYMPHOCYTES NFR BLD AUTO: 43.2 %
MAN DIFF?: NORMAL
MCHC RBC-ENTMCNC: 30.3 PG
MCHC RBC-ENTMCNC: 32.6 GM/DL
MCV RBC AUTO: 92.9 FL
MONOCYTES # BLD AUTO: 0.38 K/UL
MONOCYTES NFR BLD AUTO: 5.7 %
NEUTROPHILS # BLD AUTO: 3.33 K/UL
NEUTROPHILS NFR BLD AUTO: 50 %
PLATELET # BLD AUTO: 277 K/UL
RBC # BLD: 4.36 M/UL
RBC # FLD: 12.5 %
WBC # FLD AUTO: 6.65 K/UL

## 2021-06-01 RX ORDER — NORETHINDRONE ACETATE AND ETHINYL ESTRADIOL, ETHINYL ESTRADIOL AND FERROUS FUMARATE 1MG-10(24)
1 MG-10 MCG / KIT ORAL DAILY
Qty: 1 | Refills: 3 | Status: DISCONTINUED | COMMUNITY
Start: 2021-04-29 | End: 2021-06-01

## 2021-06-01 RX ORDER — IBUPROFEN 200 MG/1
TABLET, COATED ORAL
Refills: 0 | Status: ACTIVE | COMMUNITY

## 2021-06-01 NOTE — REVIEW OF SYSTEMS
[Patient Intake Form Reviewed] : Patient intake form was reviewed [Negative] : Heme/Lymph [Diarrhea] : diarrhea [Nausea] : nausea [Anxiety] : anxiety [Depression] : depression [Sleep Disturbances] : sleep disturbances [Abdominal Pain] : abdominal pain [Abn Vaginal bleeding] : abnormal vaginal bleeding [Vomiting] : no vomiting

## 2021-06-01 NOTE — PHYSICAL EXAM
[Appropriately responsive] : appropriately responsive [Alert] : alert [Oriented x3] : oriented x3 [Labia Majora] : normal [Labia Minora] : normal [Scant] : There was scant vaginal bleeding [Soft] : soft [Non-tender] : non-tender [Non-distended] : non-distended [No Mass] : no mass [Normal] : normal [Uterine Adnexae] : normal

## 2021-06-01 NOTE — HISTORY OF PRESENT ILLNESS
[Patient reported PAP Smear was normal] : Patient reported PAP Smear was normal [Gonorrhea test offered] : Gonorrhea test offered [Chlamydia test offered] : Chlamydia test offered [Ultrasound] : ultrasound [Menarche Age ____] : age at menarche was [unfilled] [Definite ___ (Date)] : the last menstrual period was [unfilled] [Irregular Duration] : has been irregular [Oral Contraceptive] : uses oral contraception pills [Y] : Patient is sexually active [Monogamous (Male Partner)] : is monogamous with a male partner [N] : Patient denies prior pregnancies [Menarche Age: ____] : age at menarche was [unfilled] [Currently Active] : currently active [Men] : men [Vaginal] : vaginal [No] : No [Patient refuses STI testing] : Patient refuses STI testing [TextBox_4] : PATIENT PRESENTS FOR PELVIC PAIN , BIRTH CONTROL SIDE EFFECTS AND IRREGULAR DURATION OF PERIOD. [PapSmeardate] : 01/15/21 [TextBox_31] : NEG [GonorrheaDate] : 04/22/21 [TextBox_63] : NEG [ChlamydiaDate] : 04/22/21 [TextBox_68] : NEG [LMPDate] : 04/29/21 [PGHxTotal] : 0 [TextBox_27] : PELVIC U/S: 01/15/21 [FreeTextEntry1] : 04/29/21

## 2021-06-01 NOTE — END OF VISIT
[FreeTextEntry3] : I, Roma Redd solely acted as a scribe for Dr. Jolanta Malin on 05/28/2021 . All medical entries made by the scribe were at my, Dr. Malin's, direction and personally dictated by me on 05/28/2021 . I have reviewed the chart and agree that the record accurately reflects my personal performance of the history, physical exam, assessment and plan. I have also personally directed, reviewed, and agreed with the chart. [Time Spent: ___ minutes] : I have spent [unfilled] minutes of time on the encounter.

## 2021-06-08 ENCOUNTER — ASOB RESULT (OUTPATIENT)
Age: 26
End: 2021-06-08

## 2021-06-08 ENCOUNTER — APPOINTMENT (OUTPATIENT)
Dept: OBGYN | Facility: CLINIC | Age: 26
End: 2021-06-08
Payer: COMMERCIAL

## 2021-06-08 VITALS
WEIGHT: 210 LBS | DIASTOLIC BLOOD PRESSURE: 62 MMHG | SYSTOLIC BLOOD PRESSURE: 106 MMHG | BODY MASS INDEX: 28.44 KG/M2 | HEIGHT: 72 IN | TEMPERATURE: 97.9 F

## 2021-06-08 DIAGNOSIS — N94.6 DYSMENORRHEA, UNSPECIFIED: ICD-10-CM

## 2021-06-08 DIAGNOSIS — Z87.42 PERSONAL HISTORY OF OTHER DISEASES OF THE FEMALE GENITAL TRACT: ICD-10-CM

## 2021-06-08 DIAGNOSIS — Z30.09 ENCOUNTER FOR OTHER GENERAL COUNSELING AND ADVICE ON CONTRACEPTION: ICD-10-CM

## 2021-06-08 DIAGNOSIS — N92.6 IRREGULAR MENSTRUATION, UNSPECIFIED: ICD-10-CM

## 2021-06-08 DIAGNOSIS — N92.0 EXCESSIVE AND FREQUENT MENSTRUATION WITH REGULAR CYCLE: ICD-10-CM

## 2021-06-08 PROCEDURE — 81025 URINE PREGNANCY TEST: CPT

## 2021-06-08 PROCEDURE — 76830 TRANSVAGINAL US NON-OB: CPT

## 2021-06-08 PROCEDURE — 99213 OFFICE O/P EST LOW 20 MIN: CPT | Mod: 25

## 2021-06-08 RX ORDER — NORETHINDRONE 0.35 MG/1
0.35 TABLET ORAL DAILY
Qty: 1 | Refills: 2 | Status: ACTIVE | COMMUNITY
Start: 2021-06-08 | End: 1900-01-01

## 2021-06-09 LAB
HCG UR QL: NEGATIVE
QUALITY CONTROL: YES

## 2021-06-24 ENCOUNTER — APPOINTMENT (OUTPATIENT)
Dept: OBGYN | Facility: CLINIC | Age: 26
End: 2021-06-24

## 2021-08-13 ENCOUNTER — APPOINTMENT (OUTPATIENT)
Dept: OBGYN | Facility: CLINIC | Age: 26
End: 2021-08-13

## 2021-09-28 NOTE — REVIEW OF SYSTEMS
[Patient Intake Form Reviewed] : Patient intake form was reviewed [Abdominal Pain] : abdominal pain [Abn Vaginal bleeding] : abnormal vaginal bleeding

## 2021-09-28 NOTE — DISCUSSION/SUMMARY
[FreeTextEntry1] : 1) Irregular and prolonged vaginal bleeding- She was started on Lo Loestrin OCPs at that time and discontinued it after 3 weeks due to side effects. Denies any current vaginal bleeding.\par -Contraceptive options were discussed. She desires to try a progesterone only birth control pills. Rx Micronor prescribed. R/b/a and possible side effects were reviewed. Risks and possible side effects discussed include but are not limited to headache, mood swings, breast tenderness, weight gain, menstrual cycle changes, acne, ovarian cysts, and blood clots.  Recommended that she start taking her birth control pills with her next menses and to continue using condoms with intercourse. We discussed the importance of taking POPs daily at the same time.\par \par 2) Dysmenorrhea- Recommended taking scheduled ibuprofen during menses. Recommended taking ibuprofen 800 mg q 8 hrs during menses.\par \par 3) Chronic lower abdominal pain- Denies any current abdominal pain.\par -Pelvic sono today: Retroverted uterus. EMS 11.7 mm. Right ovary with simple cyst measuring 2.06 cm. Left ovary appears WNL. No FF. Results were discussed. We discussed that there is no current GYN etiology for her chronic abdominal pain seen on pelvic sono.\par -Patient reports having a normal CT Abdomen in the ED in 4/2021 (no records).\par -She states that she previously saw GI due to her chronic pain and had a normal colonoscopy in 4/2021 (no records).\par -We discussed her options for diagnostic laparoscopy. Procedure details, r/b/a were discussed. She declines laparoscopy.\par \par -Follow up in 2 months for birth control check.\par \par All questions and concerns were discussed.

## 2021-09-28 NOTE — HISTORY OF PRESENT ILLNESS
[Y] : Patient is sexually active [N] : Patient denies prior pregnancies [Menarche Age: ____] : age at menarche was [unfilled] [Currently Active] : currently active [Men] : men [Papeardate] : 1/15/2021 [TextBox_31] : NEG [LMPDate] : 4/29/2021 [PGHxTotal] : 0 [FreeTextEntry1] : 4/29/2021

## 2021-09-28 NOTE — END OF VISIT
[FreeTextEntry3] : I, Shivam Teixeira, acted solely as a scribe for Dr. Malin on this date 06/08/2021.\par All medical record entries made by the Scribe were at my, Dr. Malin's direction and personally dictated by me on  06/08/2021. I have reviewed the chart and agree that the record accurately reflects my personal performance of the history, physical exam, assessment and plan. I have also personally directed, reviewed, and agreed with the chart.\par

## 2022-07-22 ENCOUNTER — APPOINTMENT (OUTPATIENT)
Dept: ORTHOPEDIC SURGERY | Facility: CLINIC | Age: 27
End: 2022-07-22

## 2022-07-22 VITALS — BODY MASS INDEX: 27.77 KG/M2 | HEIGHT: 72 IN | WEIGHT: 205 LBS

## 2022-07-22 PROCEDURE — 99072 ADDL SUPL MATRL&STAF TM PHE: CPT

## 2022-07-22 PROCEDURE — 99204 OFFICE O/P NEW MOD 45 MIN: CPT

## 2022-07-22 NOTE — IMAGING
[de-identified] : Right thumb with mild swelling, skin intact. +ttp at MCP, unable to stress. Able to flex and extend at IP. Sensation intact throughout. <2sec cap refill. \par \par Right thumb radiograph with no facture nor dislocation.

## 2022-07-22 NOTE — ASSESSMENT
[FreeTextEntry1] : Right thumb sprain concern for ligament rupture - will obtain STAT right thumb MRI without contrast to rule out ligament rupture and followup thereafter. Thumb spica, NSAIDs, NWB.\par \par Right thumb with full disability pending MRI - good prognosis.\par \par F/u after MRI

## 2022-07-26 ENCOUNTER — FORM ENCOUNTER (OUTPATIENT)
Age: 27
End: 2022-07-26

## 2022-07-27 ENCOUNTER — APPOINTMENT (OUTPATIENT)
Dept: ORTHOPEDIC SURGERY | Facility: CLINIC | Age: 27
End: 2022-07-27

## 2022-07-27 ENCOUNTER — FORM ENCOUNTER (OUTPATIENT)
Age: 27
End: 2022-07-27

## 2022-07-31 ENCOUNTER — FORM ENCOUNTER (OUTPATIENT)
Age: 27
End: 2022-07-31

## 2022-08-04 ENCOUNTER — FORM ENCOUNTER (OUTPATIENT)
Age: 27
End: 2022-08-04

## 2022-08-05 ENCOUNTER — RESULT REVIEW (OUTPATIENT)
Age: 27
End: 2022-08-05

## 2022-08-10 ENCOUNTER — APPOINTMENT (OUTPATIENT)
Dept: ORTHOPEDIC SURGERY | Facility: CLINIC | Age: 27
End: 2022-08-10

## 2022-08-10 VITALS — HEIGHT: 72 IN | BODY MASS INDEX: 27.77 KG/M2 | WEIGHT: 205 LBS

## 2022-08-10 PROCEDURE — 99072 ADDL SUPL MATRL&STAF TM PHE: CPT

## 2022-08-10 PROCEDURE — 99214 OFFICE O/P EST MOD 30 MIN: CPT

## 2022-08-10 RX ORDER — PREDNISONE 20 MG/1
20 TABLET ORAL
Qty: 10 | Refills: 0 | Status: COMPLETED | COMMUNITY
Start: 2022-07-05

## 2022-08-10 RX ORDER — TRIAMCINOLONE ACETONIDE 1 MG/G
0.1 CREAM TOPICAL
Qty: 80 | Refills: 0 | Status: COMPLETED | COMMUNITY
Start: 2022-07-05

## 2022-08-12 NOTE — ASSESSMENT
[FreeTextEntry1] : Right thumb partial UCL tear - reviewed MRI and pathoanatomy with patient. Thumb spica, NSAIDs, NWB. Script for right thumb spica provided, will wear for coming 3weeks. OT for ROM to start in 2-3 weeks provided, NWB through 7 weeks since injury.\par \par F/u 4weeks

## 2022-08-12 NOTE — IMAGING
[de-identified] : Right thumb with mild swelling, skin intact. +ttp at MCP, unable to stress. Able to flex and extend at IP. Sensation intact throughout. <2sec cap refill. \par \par Right thumb radiograph with no facture nor dislocation.\par Right thumb MRI with partial MCP UCL tear, no fracture nor dislocation.

## 2022-08-12 NOTE — HISTORY OF PRESENT ILLNESS
[de-identified] : 27F, LHD, No PMHX presents with right thumb injury from 7/13/22. 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 [facility], which reports no fracture. Admits to being told it was a sprain and was splinted with acewrap - but feels it isnt getting better. Admits to being in same pain since the day of injury. Admits to numbness/tingling in the thumb. \par \par 8/10/22: f/u right thumb MRI Results at . States thumb hurts more at night but is usually okay if she does not move it. \par \par IMPRESSION:\par 1. Grade 1 partial tear of the ulnar collateral ligament of the first\par metacarpophalangeal joint.\par 2. No acute osseous injury.\par 3. No tendon tear.

## 2022-09-05 ENCOUNTER — FORM ENCOUNTER (OUTPATIENT)
Age: 27
End: 2022-09-05

## 2022-09-06 ENCOUNTER — FORM ENCOUNTER (OUTPATIENT)
Age: 27
End: 2022-09-06

## 2022-09-09 ENCOUNTER — APPOINTMENT (OUTPATIENT)
Dept: ORTHOPEDIC SURGERY | Facility: CLINIC | Age: 27
End: 2022-09-09

## 2022-09-09 DIAGNOSIS — S63.629A SPRAIN OF INTERPHALANGEAL JOINT OF UNSPECIFIED THUMB, INITIAL ENCOUNTER: ICD-10-CM

## 2022-09-09 PROCEDURE — 99214 OFFICE O/P EST MOD 30 MIN: CPT

## 2022-09-09 PROCEDURE — 99072 ADDL SUPL MATRL&STAF TM PHE: CPT

## 2022-09-10 PROBLEM — S63.629A COMPLETE TEAR OF ULNAR COLLATERAL LIGAMENT OF INTERPHALANGEAL JOINT OF THUMB: Status: ACTIVE | Noted: 2022-08-10

## 2022-09-10 NOTE — IMAGING
[de-identified] : Right thumb with no swelling, skin intact. +ttp at MCP ulnar, stable to ulnar and radial stress. Able to flex and extend at IP. Sensation intact throughout. <2sec cap refill. \par \par Right thumb radiograph with no facture nor dislocation.\par Right thumb MRI with partial MCP UCL tear, no fracture nor dislocation.

## 2022-09-10 NOTE — ASSESSMENT
[FreeTextEntry1] : Right thumb partial UCL tear - re-reviewed MRI and pathoanatomy with patient. D/c thumb spica; WBAT, continue OT.\par \par Will be light duty; guarded prognosis.\par \par F/u 8weeks

## 2022-09-10 NOTE — HISTORY OF PRESENT ILLNESS
[de-identified] : 27F, LHD, No PMHX presents with right thumb injury from 7/13/22. 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 [facility], which reports no fracture. Admits to being told it was a sprain and was splinted with acewrap - but feels it isnt getting better. Admits to being in same pain since the day of injury. Admits to numbness/tingling in the thumb. \par \par 8/10/22: f/u right thumb MRI Results at . States thumb hurts more at night but is usually okay if she does not move it. \par \par IMPRESSION:\par 1. Grade 1 partial tear of the ulnar collateral ligament of the first\par metacarpophalangeal joint.\par 2. No acute osseous injury.\par 3. No tendon tear.\par \par 9/9/22: f/u right thumb. Reports pain with pressure being applied. Reports still having difficulty with small grasping items as well as zippering. Difficulty with large/heavy items. Patient was supposed to return to work Monday as full duty no brace and feels she can not do that yet.

## 2022-09-13 ENCOUNTER — FORM ENCOUNTER (OUTPATIENT)
Age: 27
End: 2022-09-13

## 2022-09-19 ENCOUNTER — FORM ENCOUNTER (OUTPATIENT)
Age: 27
End: 2022-09-19

## 2022-10-03 ENCOUNTER — APPOINTMENT (OUTPATIENT)
Dept: ORTHOPEDIC SURGERY | Facility: CLINIC | Age: 27
End: 2022-10-03

## 2022-10-03 DIAGNOSIS — S69.90XA UNSPECIFIED INJURY OF UNSPECIFIED WRIST, HAND AND FINGER(S), INITIAL ENCOUNTER: ICD-10-CM

## 2022-10-03 PROCEDURE — 99214 OFFICE O/P EST MOD 30 MIN: CPT

## 2022-10-03 PROCEDURE — 99072 ADDL SUPL MATRL&STAF TM PHE: CPT

## 2022-10-03 NOTE — HISTORY OF PRESENT ILLNESS
[de-identified] : 27F, LHD, No PMHX presents with right thumb injury from 7/13/22. 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 [facility], which reports no fracture. Admits to being told it was a sprain and was splinted with acewrap - but feels it isnt getting better. Admits to being in same pain since the day of injury. Admits to numbness/tingling in the thumb. \par \par 8/10/22: f/u right thumb MRI Results at . States thumb hurts more at night but is usually okay if she does not move it. \par \par IMPRESSION:\par 1. Grade 1 partial tear of the ulnar collateral ligament of the first\par metacarpophalangeal joint.\par 2. No acute osseous injury.\par 3. No tendon tear.\par \par 9/9/22: f/u right thumb. Reports pain with pressure being applied. Reports still having difficulty with small grasping items as well as zippering. Difficulty with large/heavy items. Patient was supposed to return to work Monday as full duty no brace and feels she can not do that yet. \par \par 10/3/22: f/u right thumb. Reports having a stabbing needle feeling in the thumb. Admits to a constant burning sensation, admits to numbness/tingling. Difficulty with lateral movements - going to OT going well but not progressing. Has d/c use of the brace.

## 2022-10-03 NOTE — IMAGING
[de-identified] : Right thumb with no swelling, skin intact. +ttp at MCP ulnar, stable to ulnar and radial stress. +ttp at thumb CMC. Able to flex and extend at IP. Sensation intact throughout. <2sec cap refill. \par \par Right thumb radiograph with no facture nor dislocation.\par Right thumb MRI with partial MCP UCL tear, no fracture nor dislocation.

## 2022-10-03 NOTE — ASSESSMENT
[FreeTextEntry1] : Right thumb partial UCL tear - re-reviewed MRI and pathoanatomy with patient. D/c thumb spica; WBAT, continue OT. Patient with severe, persistent pain. Will obtain right MRI without contrast to assess for healing or another etiology of pain. Will follow-up thereafter to develop plan of care.\par \par Will be light duty; guarded prognosis.\par \par F/u after MRI

## 2022-10-16 ENCOUNTER — FORM ENCOUNTER (OUTPATIENT)
Age: 27
End: 2022-10-16

## 2022-10-30 ENCOUNTER — FORM ENCOUNTER (OUTPATIENT)
Age: 27
End: 2022-10-30

## 2022-12-14 ENCOUNTER — APPOINTMENT (OUTPATIENT)
Dept: ORTHOPEDIC SURGERY | Facility: CLINIC | Age: 27
End: 2022-12-14

## 2022-12-28 ENCOUNTER — APPOINTMENT (OUTPATIENT)
Dept: ORTHOPEDIC SURGERY | Facility: CLINIC | Age: 27
End: 2022-12-28

## 2022-12-28 DIAGNOSIS — M79.643 PAIN IN UNSPECIFIED HAND: ICD-10-CM

## 2022-12-28 PROCEDURE — 99214 OFFICE O/P EST MOD 30 MIN: CPT

## 2022-12-28 PROCEDURE — 99072 ADDL SUPL MATRL&STAF TM PHE: CPT

## 2022-12-28 NOTE — ASSESSMENT
[FreeTextEntry1] : Right thumb partial UCL tear - re-reviewed MRI and pathoanatomy with patient. D/c thumb spica; WBAT, continue OT. Patient with severe, persistent pain with overt explanation of mechanical problem at level of thumb on MRI.\par \par At this time, patient will be referred to pain management for consultation.\par \par Will be full duty; guarded prognosis.\par \par F/u prn

## 2022-12-28 NOTE — HISTORY OF PRESENT ILLNESS
[de-identified] : 27F, LHD, No PMHX presents with right thumb injury from 7/13/22. 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 [facility], which reports no fracture. Admits to being told it was a sprain and was splinted with acewrap - but feels it isnt getting better. Admits to being in same pain since the day of injury. Admits to numbness/tingling in the thumb. \par \par 8/10/22: f/u right thumb MRI Results at . States thumb hurts more at night but is usually okay if she does not move it. \par \par IMPRESSION:\par 1. Grade 1 partial tear of the ulnar collateral ligament of the first\par metacarpophalangeal joint.\par 2. No acute osseous injury.\par 3. No tendon tear.\par \par 9/9/22: f/u right thumb. Reports pain with pressure being applied. Reports still having difficulty with small grasping items as well as zippering. Difficulty with large/heavy items. Patient was supposed to return to work Monday as full duty no brace and feels she can not do that yet. \par \par 10/3/22: f/u right thumb. Reports having a stabbing needle feeling in the thumb. Admits to a constant burning sensation, admits to numbness/tingling. Difficulty with lateral movements - going to OT going well but not progressing. Has d/c use of the brace. \par \par 12/28/22: f/u right thumb. Reports OT d/c due to no improvement (wc will not approve more) Reports OT feels needs to see pain management as her right hand will cramp up, pain will radiate up the arm. Admits to numbness/tingling in the palm of her hand, close to the wrist which will radiate up the thumb and up her arm.

## 2022-12-28 NOTE — IMAGING
[de-identified] : Right thumb with no swelling, skin intact. +ttp at MCP ulnar, stable to ulnar and radial stress. +ttp at thumb CMC. Able to flex and extend at IP. Sensation intact throughout. <2sec cap refill. \par \par Right thumb radiograph with no facture nor dislocation.\par Right thumb MRI with partial MCP UCL tear, no fracture nor dislocation.

## 2023-06-12 ENCOUNTER — EMERGENCY (EMERGENCY)
Facility: HOSPITAL | Age: 28
LOS: 1 days | Discharge: HOME | End: 2023-06-12
Attending: EMERGENCY MEDICINE
Payer: COMMERCIAL

## 2023-06-12 VITALS
DIASTOLIC BLOOD PRESSURE: 72 MMHG | HEART RATE: 65 BPM | OXYGEN SATURATION: 98 % | SYSTOLIC BLOOD PRESSURE: 121 MMHG | HEIGHT: 72 IN | TEMPERATURE: 99 F | RESPIRATION RATE: 18 BRPM | WEIGHT: 229.94 LBS

## 2023-06-12 VITALS
HEART RATE: 62 BPM | SYSTOLIC BLOOD PRESSURE: 123 MMHG | DIASTOLIC BLOOD PRESSURE: 68 MMHG | TEMPERATURE: 98 F | RESPIRATION RATE: 18 BRPM | OXYGEN SATURATION: 98 %

## 2023-06-12 LAB
ALBUMIN SERPL ELPH-MCNC: 4.4 G/DL — SIGNIFICANT CHANGE UP (ref 3.3–5.2)
ALP SERPL-CCNC: 44 U/L — SIGNIFICANT CHANGE UP (ref 40–120)
ALT FLD-CCNC: 11 U/L — SIGNIFICANT CHANGE UP
ANION GAP SERPL CALC-SCNC: 12 MMOL/L — SIGNIFICANT CHANGE UP (ref 5–17)
APPEARANCE UR: CLEAR — SIGNIFICANT CHANGE UP
AST SERPL-CCNC: 17 U/L — SIGNIFICANT CHANGE UP
BASOPHILS # BLD AUTO: 0.02 K/UL — SIGNIFICANT CHANGE UP (ref 0–0.2)
BASOPHILS NFR BLD AUTO: 0.2 % — SIGNIFICANT CHANGE UP (ref 0–2)
BILIRUB SERPL-MCNC: 0.4 MG/DL — SIGNIFICANT CHANGE UP (ref 0.4–2)
BILIRUB UR-MCNC: NEGATIVE — SIGNIFICANT CHANGE UP
BLD GP AB SCN SERPL QL: SIGNIFICANT CHANGE UP
BUN SERPL-MCNC: 6.3 MG/DL — LOW (ref 8–20)
CALCIUM SERPL-MCNC: 9.3 MG/DL — SIGNIFICANT CHANGE UP (ref 8.4–10.5)
CHLORIDE SERPL-SCNC: 104 MMOL/L — SIGNIFICANT CHANGE UP (ref 96–108)
CO2 SERPL-SCNC: 24 MMOL/L — SIGNIFICANT CHANGE UP (ref 22–29)
COLOR SPEC: YELLOW — SIGNIFICANT CHANGE UP
CREAT SERPL-MCNC: 0.54 MG/DL — SIGNIFICANT CHANGE UP (ref 0.5–1.3)
DIFF PNL FLD: ABNORMAL
EGFR: 129 ML/MIN/1.73M2 — SIGNIFICANT CHANGE UP
EOSINOPHIL # BLD AUTO: 0.03 K/UL — SIGNIFICANT CHANGE UP (ref 0–0.5)
EOSINOPHIL NFR BLD AUTO: 0.3 % — SIGNIFICANT CHANGE UP (ref 0–6)
GLUCOSE SERPL-MCNC: 84 MG/DL — SIGNIFICANT CHANGE UP (ref 70–99)
GLUCOSE UR QL: NEGATIVE MG/DL — SIGNIFICANT CHANGE UP
HCG SERPL-ACNC: <4 MIU/ML — SIGNIFICANT CHANGE UP
HCT VFR BLD CALC: 37.6 % — SIGNIFICANT CHANGE UP (ref 34.5–45)
HGB BLD-MCNC: 12.8 G/DL — SIGNIFICANT CHANGE UP (ref 11.5–15.5)
IMM GRANULOCYTES NFR BLD AUTO: 0.4 % — SIGNIFICANT CHANGE UP (ref 0–0.9)
KETONES UR-MCNC: NEGATIVE — SIGNIFICANT CHANGE UP
LEUKOCYTE ESTERASE UR-ACNC: ABNORMAL
LYMPHOCYTES # BLD AUTO: 18.7 % — SIGNIFICANT CHANGE UP (ref 13–44)
LYMPHOCYTES # BLD AUTO: 2.05 K/UL — SIGNIFICANT CHANGE UP (ref 1–3.3)
MCHC RBC-ENTMCNC: 30.8 PG — SIGNIFICANT CHANGE UP (ref 27–34)
MCHC RBC-ENTMCNC: 34 GM/DL — SIGNIFICANT CHANGE UP (ref 32–36)
MCV RBC AUTO: 90.6 FL — SIGNIFICANT CHANGE UP (ref 80–100)
MONOCYTES # BLD AUTO: 0.45 K/UL — SIGNIFICANT CHANGE UP (ref 0–0.9)
MONOCYTES NFR BLD AUTO: 4.1 % — SIGNIFICANT CHANGE UP (ref 2–14)
NEUTROPHILS # BLD AUTO: 8.37 K/UL — HIGH (ref 1.8–7.4)
NEUTROPHILS NFR BLD AUTO: 76.3 % — SIGNIFICANT CHANGE UP (ref 43–77)
NITRITE UR-MCNC: NEGATIVE — SIGNIFICANT CHANGE UP
PH UR: 6.5 — SIGNIFICANT CHANGE UP (ref 5–8)
PLATELET # BLD AUTO: 298 K/UL — SIGNIFICANT CHANGE UP (ref 150–400)
POTASSIUM SERPL-MCNC: 3.7 MMOL/L — SIGNIFICANT CHANGE UP (ref 3.5–5.3)
POTASSIUM SERPL-SCNC: 3.7 MMOL/L — SIGNIFICANT CHANGE UP (ref 3.5–5.3)
PROT SERPL-MCNC: 7.7 G/DL — SIGNIFICANT CHANGE UP (ref 6.6–8.7)
PROT UR-MCNC: NEGATIVE — SIGNIFICANT CHANGE UP
RBC # BLD: 4.15 M/UL — SIGNIFICANT CHANGE UP (ref 3.8–5.2)
RBC # FLD: 12.8 % — SIGNIFICANT CHANGE UP (ref 10.3–14.5)
RBC CASTS # UR COMP ASSIST: ABNORMAL /HPF (ref 0–4)
SODIUM SERPL-SCNC: 140 MMOL/L — SIGNIFICANT CHANGE UP (ref 135–145)
SP GR SPEC: 1 — LOW (ref 1.01–1.02)
UROBILINOGEN FLD QL: NEGATIVE MG/DL — SIGNIFICANT CHANGE UP
WBC # BLD: 10.96 K/UL — HIGH (ref 3.8–10.5)
WBC # FLD AUTO: 10.96 K/UL — HIGH (ref 3.8–10.5)
WBC UR QL: SIGNIFICANT CHANGE UP /HPF (ref 0–5)

## 2023-06-12 PROCEDURE — 76856 US EXAM PELVIC COMPLETE: CPT | Mod: 26

## 2023-06-12 PROCEDURE — 86901 BLOOD TYPING SEROLOGIC RH(D): CPT

## 2023-06-12 PROCEDURE — 76830 TRANSVAGINAL US NON-OB: CPT

## 2023-06-12 PROCEDURE — 84702 CHORIONIC GONADOTROPIN TEST: CPT

## 2023-06-12 PROCEDURE — 96376 TX/PRO/DX INJ SAME DRUG ADON: CPT

## 2023-06-12 PROCEDURE — 96374 THER/PROPH/DIAG INJ IV PUSH: CPT

## 2023-06-12 PROCEDURE — 80053 COMPREHEN METABOLIC PANEL: CPT

## 2023-06-12 PROCEDURE — 36415 COLL VENOUS BLD VENIPUNCTURE: CPT

## 2023-06-12 PROCEDURE — 99284 EMERGENCY DEPT VISIT MOD MDM: CPT

## 2023-06-12 PROCEDURE — 99284 EMERGENCY DEPT VISIT MOD MDM: CPT | Mod: 25

## 2023-06-12 PROCEDURE — 87086 URINE CULTURE/COLONY COUNT: CPT

## 2023-06-12 PROCEDURE — 85025 COMPLETE CBC W/AUTO DIFF WBC: CPT

## 2023-06-12 PROCEDURE — 86900 BLOOD TYPING SEROLOGIC ABO: CPT

## 2023-06-12 PROCEDURE — 76856 US EXAM PELVIC COMPLETE: CPT

## 2023-06-12 PROCEDURE — 76830 TRANSVAGINAL US NON-OB: CPT | Mod: 26

## 2023-06-12 PROCEDURE — 86850 RBC ANTIBODY SCREEN: CPT

## 2023-06-12 PROCEDURE — 81001 URINALYSIS AUTO W/SCOPE: CPT

## 2023-06-12 RX ORDER — KETOROLAC TROMETHAMINE 30 MG/ML
15 SYRINGE (ML) INJECTION ONCE
Refills: 0 | Status: DISCONTINUED | OUTPATIENT
Start: 2023-06-12 | End: 2023-06-12

## 2023-06-12 RX ADMIN — Medication 15 MILLIGRAM(S): at 17:39

## 2023-06-12 RX ADMIN — Medication 15 MILLIGRAM(S): at 23:35

## 2023-06-12 NOTE — ED STATDOCS - CLINICAL SUMMARY MEDICAL DECISION MAKING FREE TEXT BOX
27 y/o female hx menorhaggia/dysmenorrhea with 1 day worse lower abd cramping and heavy bleeding. took midol with minimal relief. uncomfortable appearing, required D and C in past and was told may need exploratory surgery next. labs, symptom control, u/s. reassess 27 y/o female hx menorhaggia/dysmenorrhea with 1 day worse lower abd cramping and heavy bleeding. took midol with minimal relief. uncomfortable appearing, required D and C in past and was told may need exploratory surgery next. labs, symptom control, u/s. reassess    PAKO Young: 28-year-old female presents emergency department with lower abdominal pain and vaginal bleeding.  Labs were obtained showed no leukocytosis and normal H&H.  BMP within normal limits.  Ultrasound shows no acute pathology.  Results were discussed with the patient.  She reports having some increased pain after the transvaginal ultrasound.  Will medicate with Toradol.  Patient be discharged outpatient follow-up with her OB/GYN.  ED return precautions discussed

## 2023-06-12 NOTE — ED STATDOCS - CARE PROVIDER_API CALL
Halie Paz  Obstetrics and Gynecology  [address] [place] [address], [address] [place] [address]  Phone: [phone]  Fax: [phone]  Follow Up Time:

## 2023-06-12 NOTE — ED STATDOCS - ATTENDING APP SHARED VISIT CONTRIBUTION OF CARE
Naseem: I performed a face to face bedside interview with patient regarding history of present illness, review of symptoms and past medical history. I completed an independent physical exam and ordered tests/medications as needed.  I have discussed patient's plan of care with advanced care provider. The advanced care provider assisted in  executing the discussed plan. I was available for any questions or issues that may have arose during the execution of the plan of care.

## 2023-06-12 NOTE — ED STATDOCS - NS ED ATTENDING STATEMENT MOD
This was a shared visit with the ANDREINA. I reviewed and verified the documentation and independently performed the documented:

## 2023-06-12 NOTE — ED STATDOCS - NSFOLLOWUPINSTRUCTIONS_ED_ALL_ED_FT
Abdominal Pain    Many things can cause abdominal pain. Many times, abdominal pain is not caused by a disease and will improve without treatment. Your health care provider will do a physical exam to determine if there is a dangerous cause of your pain; blood tests and imaging may help determine the cause of your pain. However, in many cases, no cause may be found and you may need further testing as an outpatient. Monitor your abdominal pain for any changes.     SEEK IMMEDIATE MEDICAL CARE IF YOU HAVE ANY OF THE FOLLOWING SYMPTOMS: worsening abdominal pain, uncontrollable vomiting, profuse diarrhea, inability to have bowel movements or pass gas, black or bloody stools, fever accompanying chest pain or back pain, or fainting. These symptoms may represent a serious problem that is an emergency. Do not wait to see if the symptoms will go away. Get medical help right away. Call 911 and do not drive yourself to the hospital.     Please follow up with your OB/GYN within 48 hours

## 2023-06-12 NOTE — ED ADULT NURSE NOTE - NSFALLUNIVINTERV_ED_ALL_ED
Bed/Stretcher in lowest position, wheels locked, appropriate side rails in place/Call bell, personal items and telephone in reach/Instruct patient to call for assistance before getting out of bed/chair/stretcher/Non-slip footwear applied when patient is off stretcher/[place] to call system/Physically safe environment - no spills, clutter or unnecessary equipment/Purposeful proactive rounding/Room/bathroom lighting operational, light cord in reach

## 2023-06-12 NOTE — ED ADULT NURSE NOTE - OBJECTIVE STATEMENT
Patient is alert and oriented X4 from home. Patient denies any chance she could be pregnant. Patient arrived to ED today with c/o vaginal bleeding and lower abdominal pains.

## 2023-06-12 NOTE — ED STATDOCS - OBJECTIVE STATEMENT
28 y /o female hx D and C for abnl growths/abnl periods 3 years ago c/o woresning lower abd cramping with each period over past few months. States period started today on schedule but is most intense. Gets heavier bleeding for first few days. no f/c. no urinary s ymptoms. was told woul dneed exploratory surgery by her gyn but she states cant afford it so stopped.

## 2023-06-12 NOTE — ED STATDOCS - PHYSICAL EXAMINATION
Gen: non toxic, uncomfortable.   HEENT: Mucous membranes moist, pink conjunctivae, EOMI  CV: RRR, nl s1/s2.  Resp: CTAB, normal rate and effort  GI: lower abd ttp b/l/suprapubic. no rebound/guarding.   : No CVAT  Neuro: A&O x 3, moving all 4 extremities  MSK: No spine or joint tenderness to palpation  Skin: No rashes. intact and perfused.

## 2023-06-13 LAB
CULTURE RESULTS: SIGNIFICANT CHANGE UP
SPECIMEN SOURCE: SIGNIFICANT CHANGE UP